# Patient Record
(demographics unavailable — no encounter records)

---

## 2024-10-17 NOTE — REASON FOR VISIT
[Follow-Up Evaluation] : a follow-up evaluation for [ADHD] : ADHD [Patient] : patient [Home] : at home, [unfilled] , at the time of the visit. [Medical Office: ([facility])___] : at the medical office located in  [Mother] : mother [FreeTextEntry2] : MOTHER

## 2024-10-17 NOTE — PHYSICAL EXAM
[Well-appearing] : well-appearing [Normocephalic] : normocephalic [No dysmorphic facial features] : no dysmorphic facial features [No ocular abnormalities] : no ocular abnormalities [Neck supple] : neck supple [No deformities] : no deformities [Alert] : alert [Well related, good eye contact] : well related, good eye contact [Conversant] : conversant [Normal speech and language] : normal speech and language [Follows instructions well] : follows instructions well [No facial asymmetry or weakness] : no facial asymmetry or weakness [Midline tongue, no fasciculations] : midline tongue, no fasciculations [Gets up on table without difficulty] : gets up on table without difficulty [2+ biceps] : 2+ biceps [No ankle clonus] : no ankle clonus

## 2024-10-17 NOTE — CONSULT LETTER
[Dear  ___] : Dear  [unfilled], [Courtesy Letter:] : I had the pleasure of seeing your patient, [unfilled], in my office today. [Please see my note below.] : Please see my note below. [Consult Closing:] : Thank you very much for allowing me to participate in the care of this patient.  If you have any questions, please do not hesitate to contact me. [Sincerely,] : Sincerely, [FreeTextEntry3] : Rickey Zelaya NP-BC Certified Family Nurse Practitioner Pediatric Neurology [facility]

## 2024-10-17 NOTE — QUALITY MEASURES
[Impairment in more than one setting] : Impairment in more than one setting: Yes [Coexisting conditions] : Coexisting conditions: Yes [Medication choices] : Medication choices: Not Applicable [Side effects of medications] : Side effects of medications: Not Applicable [FreeTextEntry1] : [place] forms reviewed ADHD diagnosis. Metadate 20 mg

## 2024-10-17 NOTE — ASSESSMENT
[FreeTextEntry1] : Armando is a 8 y/o boy seen today for a follow-up visit for ADHD. Doing well on Metadate to 20 mg ER. Will continue with Metadate 20 mg rx sent to pharmacy on file.

## 2024-10-17 NOTE — HISTORY OF PRESENT ILLNESS
[FreeTextEntry1] : CURRENT VISIT 10/17/2024 Armando is a 8 y/o boy seen today for a follow-up visit for ADHD. He is currently taking metadate 20 mg and he is doing well on that dose. He is only taking the medication on school days, but he has a decreased appetite. He is eating a full breakfast and small amount of his lunch but finishes his lunch and eats dinner.   ____________________________________________________________ PREVIOUS VISIT 2024 Armando is a 8 y/o boy seen today for a follow-up visit for ADHD. he was started on Metadate 10 mg PO in May, both teacher and patient stated they didn't see a difference in his ability to focus. Mom denies any side effects to the medication.  _______________________________________________________ PREVIOUS VISIT 2024 Armando is a 8 y/o boy seen today for an initial visit for inattention and behavioral concerns. [facility] At home, mom reports that he was unable to focus, sit still, easily distracted. He is unorganized and is forgetful. Sometimes he is unable to follow multi-step instructions without reminders. During homework mom reports that he is doing good because it is not that much. He is unable to sit through a meal without getting up. He is awaiting an OT evaluation for sensory issues.     At school, Teachers are reporting that he is distracted and unable to focus. During reading he needs frequent reminders to stay on topic. He is disorganized and loses school supplies frequently. He is unable to sit in is seat and walks around the classroom. He needs frequent reminders to give others personal space. teachers think he has the potential to do better if he was able to focus and control his impulsive behavior. Grades are okay.    Early development: ARMANDO was born full term via . he was discharged home with mother. he hit all early developmental milestones appropriately.  ARMANDO attended day care program starting at 3 years old and then pre-school at age 4. Mom reports that she started noticing ADHD symptoms last year when he was in 1st grade.   Educational assessment: 504 plan, Extra time on exams an sitting in the front of the class.  Current Grade: 2nd grade  Current District: [place], [place]  General ED/Any Accommodations/ICT in place: ICT class with 17 students and 2 teachers.    Hyperactivity: Fidgety   Impulsivity:  Can be aggressive sometimes during recess if someone gets in his space. Interrupts when others are speaking, and sometimes has a hard time waiting   Social Concerns: has trouble listening, talkative. has friends at school  Sleep: sleeps well goes to sleep at 8pm and wakes up at 630am sleep through the night Eating: eats a varied diet Play: Baseball, basketball and is able to focus    Family hx of developmental delays/ADD/ADHD: Denies  Other coexisting behaviors? -Mood disorder/depression: Paternal side -Anxiety: Paternal side     Co- morbidities: No concern for anxiety, depression, OCD   Other health concerns: Denies staring, twitching, seizure or seizure-like activity. No serious head injury, meningoencephalitis.

## 2024-10-17 NOTE — PLAN
[FreeTextEntry1] : CURRENT PLAN 10/17/2024 - Continue with Metadate 20 mg - Follow-up in 3 months _________________________________________  PREVIOUS PLAN 7/9/2024 - Increase Metadate to 20mg  -Follow-up in 3 months  __________________________________________ PREVIOUS PLAN 5/23/2024 -  Mom will email records from psychologist who diagnosed ADHD - Discussed use of medications as well as side effects if accommodations do not improve school performance - Start on Metadate 10 mg PO in the morning after breakfast - Follow up 1 month to reassess

## 2025-04-24 NOTE — REASON FOR VISIT
[Follow-Up Evaluation] : a follow-up evaluation for [ADHD] : ADHD [Patient] : patient [Home] : at home, [unfilled] , at the time of the visit. [Medical Office: ([facility])___] : at the medical office located in  [Mother] : mother [FreeTextEntry2] : MOTHER

## 2025-04-24 NOTE — HISTORY OF PRESENT ILLNESS
[FreeTextEntry1] : INTERVAL HX 2025 Armando is an 7 y/o boy seen today for a follow-up visit for ADHD. He is currently taking metadate 20 mg and he is doing well on that dose. Denies any side effects to the medication. Mom would like to continue current dose. _____________________________________  INTERVAL HX 10/17/2024 Armando is a 8 y/o boy seen today for a follow-up visit for ADHD. He is currently taking metadate 20 mg and he is doing well on that dose. He is only taking the medication on school days, but he has a decreased appetite. He is eating a full breakfast and small amount of his lunch but finishes his lunch and eats dinner.   ____________________________________________________________ PREVIOUS VISIT 2024 Armando is a 8 y/o boy seen today for a follow-up visit for ADHD. he was started on Metadate 10 mg PO in May, both teacher and patient stated they didn't see a difference in his ability to focus. Mom denies any side effects to the medication.  _______________________________________________________ PREVIOUS VISIT 2024 Armando is a 8 y/o boy seen today for an initial visit for inattention and behavioral concerns. [facility]. At home, mom reports that he was unable to focus, sit still, easily distracted. He is unorganized and is forgetful. Sometimes he is unable to follow multi-step instructions without reminders. During homework mom reports that he is doing good because it is not that much. He is unable to sit through a meal without getting up. He is awaiting an OT evaluation for sensory issues.     At school, Teachers are reporting that he is distracted and unable to focus. During reading he needs frequent reminders to stay on topic. He is disorganized and loses school supplies frequently. He is unable to sit in is seat and walks around the classroom. He needs frequent reminders to give others personal space. teachers think he has the potential to do better if he was able to focus and control his impulsive behavior. Grades are okay.    Early development: ARMANDO was born full term via . he was discharged home with mother. he hit all early developmental milestones appropriately.  ARMANDO attended day care program starting at 3 years old and then pre-school at age 4. Mom reports that she started noticing ADHD symptoms last year when he was in 1st grade.   Educational assessment: 504 plan, Extra time on exams an sitting in the front of the class.  Current Grade: 2nd grade  Current District: [place], [place]  General ED/Any Accommodations/ICT in place: ICT class with 17 students and 2 teachers.    Hyperactivity: Fidgety   Impulsivity:  Can be aggressive sometimes during recess if someone gets in his space. Interrupts when others are speaking, and sometimes has a hard time waiting   Social Concerns: has trouble listening, talkative. has friends at school  Sleep: sleeps well goes to sleep at 8pm and wakes up at 630am sleep through the night Eating: eats a varied diet Play: Baseball, basketball and is able to focus    Family hx of developmental delays/ADD/ADHD: Denies  Other coexisting behaviors? -Mood disorder/depression: Paternal side -Anxiety: Paternal side     Co- morbidities: No concern for anxiety, depression, OCD   Other health concerns: Denies staring, twitching, seizure or seizure-like activity. No serious head injury, meningoencephalitis.

## 2025-04-24 NOTE — ASSESSMENT
[FreeTextEntry1] : Armando is an 9 y/o boy seen today for a follow-up visit for ADHD. Doing well on Metadate to 20 mg ER. Will continue with Metadate 20 mg rx sent to pharmacy on file.

## 2025-04-24 NOTE — PLAN
[FreeTextEntry1] : CURRENT PLAN 04/24/2024 - Continue with Metadate 20 mg - Follow-up in 3 months ______________________________________ PREVIOUS PLAN 10/17/2024 - Continue with Metadate 20 mg - Follow-up in 3 months _________________________________________  PREVIOUS PLAN 7/9/2024 - Increase Metadate to 20mg  -Follow-up in 3 months  __________________________________________ PREVIOUS PLAN 5/23/2024 -  Mom will email records from psychologist who diagnosed ADHD - Discussed use of medications as well as side effects if accommodations do not improve school performance - Start on Metadate 10 mg PO in the morning after breakfast - Follow up 1 month to reassess

## 2025-04-24 NOTE — QUALITY MEASURES
[Impairment in more than one setting] : Impairment in more than one setting: Yes [Coexisting conditions] : Coexisting conditions: Yes [Medication choices] : Medication choices: Not Applicable [Side effects of medications] : Side effects of medications: Not Applicable [FreeTextEntry1] : [place] forms reviewed ADHD diagnosis. Metadate 20 mg

## 2025-04-24 NOTE — CONSULT LETTER
[Dear  ___] : Dear  [unfilled], [Courtesy Letter:] : I had the pleasure of seeing your patient, [unfilled], in my office today. [Please see my note below.] : Please see my note below. [Consult Closing:] : Thank you very much for allowing me to participate in the care of this patient.  If you have any questions, please do not hesitate to contact me. [Sincerely,] : Sincerely, [FreeTextEntry3] : Rickey Zelaya NP-BC Certified Family Nurse Practitioner Pediatric Neurology [facility]

## 2025-04-24 NOTE — PHYSICAL EXAM
[Well-appearing] : well-appearing [Normocephalic] : normocephalic [No dysmorphic facial features] : no dysmorphic facial features [No ocular abnormalities] : no ocular abnormalities [Neck supple] : neck supple [No deformities] : no deformities [Alert] : alert [Well related, good eye contact] : well related, good eye contact [Conversant] : conversant [Normal speech and language] : normal speech and language [Follows instructions well] : follows instructions well